# Patient Record
Sex: MALE | Race: WHITE
[De-identification: names, ages, dates, MRNs, and addresses within clinical notes are randomized per-mention and may not be internally consistent; named-entity substitution may affect disease eponyms.]

---

## 2017-02-23 ENCOUNTER — HOSPITAL ENCOUNTER (OUTPATIENT)
Dept: HOSPITAL 47 - RADMRIMAIN | Age: 32
Discharge: HOME | End: 2017-02-23
Payer: COMMERCIAL

## 2017-02-23 DIAGNOSIS — C71.9: Primary | ICD-10-CM

## 2017-02-23 DIAGNOSIS — I67.1: ICD-10-CM

## 2017-02-23 DIAGNOSIS — G35: ICD-10-CM

## 2017-02-23 PROCEDURE — 70544 MR ANGIOGRAPHY HEAD W/O DYE: CPT

## 2017-02-23 PROCEDURE — 70553 MRI BRAIN STEM W/O & W/DYE: CPT

## 2017-02-23 NOTE — MR
PRE AND POSTCONTRAST ENHANCED MRI OF THE BRAIN:

 

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
 ct of brain in pacs 

 

COMPARISON: August 4, 2014

 

 

CONTRAST: 20  ML Multihance

 

Multiplanar and multispin-echo imaging of the brain was performed both before and after the administr
ation of contrast. 

 

The ventricles, basal cisterns and sulci overlying the cerebral convexities are within normal limits.
  

 

There is no evidence for midline shift or mass effect.  

 

Acute intracranial hemorrhage or extra-axial collection is not evident.  

 

There are no abnormal areas of increased or decreased signal intensity within the brain parenchyma.  


 

Following contrast administration, there is no evidence for pathologic enhancement or enhancing mass.
  

 

The paranasal sinuses and mastoid air cells are well-aerated.

 

IMPRESSION:    

 

Unremarkable pre and postcontrast enhanced MRI of the brain. See separate MRA report.

## 2017-02-23 NOTE — MR
EXAMINATION TYPE: MR angio head wo con

 

DATE OF EXAM: 2/23/2017 10:53 AM

 

COMPARISON: August 4, 2014

 

HISTORY: aneurysm

 

Three-dimensional time-of-flight intracranial MRA was performed with multiple intensity projection im
ages submitted and source data reviewed at the workstation.  

 

The vertebrobasilar system as well as intracranial portions of the internal carotid arteries and thei
r major tributaries are patent. I cannot exclude a small 1.5 mm aneurysm at the take off of the left 
M2 segment.

 

IMPRESSION:

 

  I cannot exclude a small 1.5 mm aneurysm at the take off of the left M2 segment.

## 2017-03-22 ENCOUNTER — HOSPITAL ENCOUNTER (OUTPATIENT)
Dept: HOSPITAL 47 - RADECHMAIN | Age: 32
End: 2017-03-22
Payer: COMMERCIAL

## 2017-03-22 DIAGNOSIS — R53.81: ICD-10-CM

## 2017-03-22 DIAGNOSIS — I08.1: Primary | ICD-10-CM

## 2017-03-22 PROCEDURE — 93306 TTE W/DOPPLER COMPLETE: CPT

## 2017-03-22 NOTE — ECHOF
Referral Reason:R43.81 fatigue



MEASUREMENTS

--------

HEIGHT: 172.7 cm

WEIGHT: 86.2 kg

BP: 131/79

RVIDd:   2.9 cm     (< 3.3)

IVSd:   1.0 cm     (0.6 - 1.1)

LVIDd:   4.0 cm     (3.9 - 5.3)

LVPWd:   1.0 cm     (0.6 - 1.1)

IVSs:   1.5 cm

LVIDs:   2.9 cm

LVPWs:   1.5 cm

LA Diam:   3.0 cm     (2.7 - 3.8)

LAESV Index (A-L):   13.48 ml/m

Ao Diam:   2.9 cm     (2.0 - 3.7)

AV Cusp:   2.1 cm     (1.5 - 2.6)

LA Diam:   3.0 cm     (2.7 - 3.8)

MV EXCURSION:   19.783 mm     (> 18.000)

MV EF SLOPE:   160 mm/s     (70 - 150)

EPSS:   0.3 cm

MV E Don:   0.87 m/s

MV DecT:   228 ms

MV A Don:   0.71 m/s

MV E/A Ratio:   1.22 







FINDINGS

--------

Sinus rhythm.

This was a technically good study.

The left ventricular size is normal.   Left ventricular 

wall thickness is normal.   Overall left ventricular 

systolic function is normal with, an EF between 55 - 60 

%.

The right ventricle is normal in size.

Normal LA  size by volume 22+/-6 ml/m2.

The right atrium is normal in size.

The aortic valve is trileaflet and appears structurally 

normal.

The mitral valve is normal.   There is trace mitral 

regurgitation.

Trace tricuspid regurgitation present.   Right 

ventricular systolic pressure is normal at < 35 mmHg.

There is no pulmonic regurgitation present.

The aortic root size is normal.

IVC Not well visulized.

There is no pericardial effusion.



CONCLUSIONS

--------

1. Sinus rhythm.

2. There is no pulmonic regurgitation present.

3. The aortic root size is normal.

4. IVC Not well visulized.

5. There is no pericardial effusion.

6. This was a technically good study.

7. Left ventricular wall thickness is normal.

8. Overall left ventricular systolic function is normal 

with, an EF between 55 - 60 %.

9. Normal LA size by volume 22+/-6 ml/m2.

10. The aortic valve is trileaflet and appears structurally 

normal.

11. There is trace mitral regurgitation.

12. Trace tricuspid regurgitation present.

13. Right ventricular systolic pressure is normal at < 35 

mmHg.





SONOGRAPHER: Toni Noonan RDCS

## 2017-05-26 ENCOUNTER — HOSPITAL ENCOUNTER (OUTPATIENT)
Dept: HOSPITAL 47 - LABWHC1 | Age: 32
Discharge: HOME | End: 2017-05-26
Payer: COMMERCIAL

## 2017-05-26 DIAGNOSIS — R26.81: ICD-10-CM

## 2017-05-26 DIAGNOSIS — R42: Primary | ICD-10-CM

## 2017-05-26 LAB
ANA W/REFLEX TO TITER: NEGATIVE
BUN SERPL-SCNC: 13 MG/DL (ref 9–20)
NON-AFRICAN AMERICAN GFR(MDRD): >60
RHEUMATOID FACT SERPL-ACNC: <9 IU/ML (ref ?–12)
VIT B12 SERPL-MCNC: 669 PG/ML (ref 239–931)

## 2017-05-26 PROCEDURE — 86225 DNA ANTIBODY NATIVE: CPT

## 2017-05-26 PROCEDURE — 86038 ANTINUCLEAR ANTIBODIES: CPT

## 2017-05-26 PROCEDURE — 86431 RHEUMATOID FACTOR QUANT: CPT

## 2017-05-26 PROCEDURE — 83655 ASSAY OF LEAD: CPT

## 2017-05-26 PROCEDURE — 82607 VITAMIN B-12: CPT

## 2017-05-26 PROCEDURE — 82565 ASSAY OF CREATININE: CPT

## 2017-05-26 PROCEDURE — 84520 ASSAY OF UREA NITROGEN: CPT

## 2017-05-26 PROCEDURE — 82746 ASSAY OF FOLIC ACID SERUM: CPT

## 2017-05-26 PROCEDURE — 36415 COLL VENOUS BLD VENIPUNCTURE: CPT

## 2017-05-26 PROCEDURE — 82175 ASSAY OF ARSENIC: CPT

## 2017-06-05 ENCOUNTER — HOSPITAL ENCOUNTER (OUTPATIENT)
Dept: HOSPITAL 47 - RADMRIMAIN | Age: 32
Discharge: HOME | End: 2017-06-05
Payer: COMMERCIAL

## 2017-06-05 DIAGNOSIS — M50.222: Primary | ICD-10-CM

## 2017-06-05 DIAGNOSIS — G35: ICD-10-CM

## 2017-06-05 PROCEDURE — 70553 MRI BRAIN STEM W/O & W/DYE: CPT

## 2017-06-05 PROCEDURE — 72156 MRI NECK SPINE W/O & W/DYE: CPT

## 2017-06-05 NOTE — MR
MR brain and cervical spine with and without contrast

 

HISTORY: Multiple sclerosis

 

Multiplanar multisequence and postcontrast images obtained through the brain and cervical spine follo
wing 20 cc MultiHance IV

 

Relation to prior MRI brain 2/23/2017, CT cervical spine 7/27/2015

 

Brain MRI with and without contrast: Single focal hyperintensity in inversion recovery and T2 sequenc
es within the right frontal deep white matter is stable. There is no hemorrhage or hydrocephalus. 2 e
nteric, cervical medullary junction, cerebellopontine angles are normal. There are normal vascular fl
ow voids. There is no restricted diffusion. The orbits show symmetric appearance. No abnormal enhance
ment following contrast administration.

 

IMPRESSION: Stable exam, no significant interval change is evident.

 

Cervical spine MRI: Cervical vertebral bodies show preserved height and alignment, bone marrow signal
. Cervical cord signal is maintained.

 

C2-3: Unremarkable

 

C3-4: Bilateral foraminal encroachment is present right greater than left due to lateral extension of
 endplate disc complex, no significant spinal stenosis or disc herniation

 

C4-5: There is some left-sided foraminal encroachment. No evident disc herniation. No significant pamela
tral stenosis.

 

C5-6: There is a posterior disc herniation which contacts the anterior cervical cord. On mild central
 stenosis. There is bilateral foraminal encroachment.

 

C6-7: Small posterior central disc protrusion causes minimal anterior mass effect on the thecal sac. 
Suspect some foraminal encroachment on the left greater than right.

 

C7-T1: Within normal limits.

 

Disc spaces relatively maintained. No abnormal enhancement following contrast menstruation.

 

IMPRESSION: Small disc herniation C5-6, multilevel foraminal encroachment.

## 2018-05-25 ENCOUNTER — HOSPITAL ENCOUNTER (OUTPATIENT)
Dept: HOSPITAL 47 - RADMRIMAIN | Age: 33
Discharge: HOME | End: 2018-05-25
Attending: PSYCHIATRY & NEUROLOGY
Payer: COMMERCIAL

## 2018-05-25 DIAGNOSIS — H47.212: Primary | ICD-10-CM

## 2018-05-25 DIAGNOSIS — Z88.0: ICD-10-CM

## 2018-05-25 PROCEDURE — 70543 MRI ORBT/FAC/NCK W/O &W/DYE: CPT

## 2018-05-25 NOTE — MR
EXAMINATION TYPE: MR orbits wo/w con

 

DATE OF EXAM: 5/25/2018

 

COMPARISON: NONE

 

HISTORY: Primary optic atrophy, left eye

 

CONTRAST:  Performed utilizing 7.5 mL intravenous Gadavist gadolinium contrast.  

 

TECHNIQUE: Multiplanar, multiecho imaging on a 3.0 Breanna magnet is performed through the brain.  Stud
y is performed within 24 hours of arrival to the hospital. Dedicated images were obtained through the
 orbits.

 

The craniovertebral junction is normal.  The pituitary is normal.  The pituitary stalk is midline. No
te is made a right septal deviation. The optic chiasm appears unremarkable.

 

No intraconal or extraconal fat signal abnormality is evident. Signal within the extraocular muscles 
appear unremarkable. No expansion of the extraocular muscles are evident

 

The globes are symmetrical. Lenses appear unremarkable. Optic nerves appear normal. The left optic ne
rve appears symmetrical with the contralateral right No signal abnormality within the optic nerves is
 evident.

 

Ventricles and sulci as visualized on this exam are appropriate for the patient age.

 

 

IMPRESSIONS:

1. Normal-appearing optic nerves.

## 2018-06-26 ENCOUNTER — HOSPITAL ENCOUNTER (OUTPATIENT)
Dept: HOSPITAL 47 - LABWHC1 | Age: 33
Discharge: HOME | End: 2018-06-26
Payer: COMMERCIAL

## 2018-06-26 DIAGNOSIS — R53.83: Primary | ICD-10-CM

## 2018-06-26 LAB
ACTH PLAS-MCNC: 10.3 PG/ML (ref 0–45.99)
ALBUMIN SERPL-MCNC: 4.6 G/DL (ref 3.5–5)
ALP SERPL-CCNC: 64 U/L (ref 38–126)
ALT SERPL-CCNC: 29 U/L (ref 21–72)
ANION GAP SERPL CALC-SCNC: 11 MMOL/L
AST SERPL-CCNC: 24 U/L (ref 17–59)
BUN SERPL-SCNC: 20 MG/DL (ref 9–20)
CALCIUM SPEC-MCNC: 9.7 MG/DL (ref 8.4–10.2)
CHLORIDE SERPL-SCNC: 103 MMOL/L (ref 98–107)
CO2 SERPL-SCNC: 26 MMOL/L (ref 22–30)
ERYTHROCYTE [DISTWIDTH] IN BLOOD BY AUTOMATED COUNT: 5.46 M/UL (ref 4.3–5.9)
ERYTHROCYTE [DISTWIDTH] IN BLOOD: 12.9 % (ref 11.5–15.5)
GLUCOSE SERPL-MCNC: 90 MG/DL (ref 74–99)
HCT VFR BLD AUTO: 46.4 % (ref 39–53)
HGB BLD-MCNC: 15.1 GM/DL (ref 13–17.5)
MCH RBC QN AUTO: 27.7 PG (ref 25–35)
MCHC RBC AUTO-ENTMCNC: 32.6 G/DL (ref 31–37)
MCV RBC AUTO: 84.8 FL (ref 80–100)
PLATELET # BLD AUTO: 323 K/UL (ref 150–450)
POTASSIUM SERPL-SCNC: 4.3 MMOL/L (ref 3.5–5.1)
PROT SERPL-MCNC: 7.3 G/DL (ref 6.3–8.2)
SODIUM SERPL-SCNC: 140 MMOL/L (ref 137–145)
T4 FREE SERPL-MCNC: 1.24 NG/DL (ref 0.78–2.19)
VIT B12 SERPL-MCNC: 658 PG/ML (ref 200–944)
WBC # BLD AUTO: 7.4 K/UL (ref 3.8–10.6)

## 2018-06-26 PROCEDURE — 80053 COMPREHEN METABOLIC PANEL: CPT

## 2018-06-26 PROCEDURE — 82533 TOTAL CORTISOL: CPT

## 2018-06-26 PROCEDURE — 36415 COLL VENOUS BLD VENIPUNCTURE: CPT

## 2018-06-26 PROCEDURE — 83002 ASSAY OF GONADOTROPIN (LH): CPT

## 2018-06-26 PROCEDURE — 84439 ASSAY OF FREE THYROXINE: CPT

## 2018-06-26 PROCEDURE — 82024 ASSAY OF ACTH: CPT

## 2018-06-26 PROCEDURE — 84443 ASSAY THYROID STIM HORMONE: CPT

## 2018-06-26 PROCEDURE — 86376 MICROSOMAL ANTIBODY EACH: CPT

## 2018-06-26 PROCEDURE — 82607 VITAMIN B-12: CPT

## 2018-06-26 PROCEDURE — 84146 ASSAY OF PROLACTIN: CPT

## 2018-06-26 PROCEDURE — 84403 ASSAY OF TOTAL TESTOSTERONE: CPT

## 2018-06-26 PROCEDURE — 85027 COMPLETE CBC AUTOMATED: CPT

## 2018-06-26 PROCEDURE — 84481 FREE ASSAY (FT-3): CPT

## 2018-07-11 ENCOUNTER — HOSPITAL ENCOUNTER (OUTPATIENT)
Dept: HOSPITAL 47 - RADCTMAIN | Age: 33
Discharge: HOME | End: 2018-07-11
Attending: OTOLARYNGOLOGY
Payer: COMMERCIAL

## 2018-07-11 DIAGNOSIS — J32.9: Primary | ICD-10-CM

## 2018-07-11 PROCEDURE — 70486 CT MAXILLOFACIAL W/O DYE: CPT

## 2018-07-11 NOTE — CT
EXAMINATION TYPE: CT sinus wo con

 

DATE OF EXAM: 7/11/2018

 

COMPARISON: CT brain July 27, 2015

 

HISTORY: Chronic sinusitis for the past 5 months. Vertigo and facial pressure for 5 months with heada
ches and vision changes as well as dizziness and facial numbness all per patient.

 

CT DLP: 628.70 mGycm.  Automated Exposure Control for Dose Reduction was Utilized.

 

TECHNIQUE: CT scan of the sinuses is performed without contrast, axial images are obtained, coronal r
eformatted images are also reviewed.

 

FINDINGS: The  paranasal sinuses including the frontal, ethmoid, sphenoid, and maxillary sinuses bila
terally are well-aerated without abnormal opacification.  The ostiomeatal complex is patent bilateral
ly on the coronal images. Nasal septum is deviated to right of midline not significantly changed from
 prior.

 

Visualized portion of mastoid air cells show no abnormal opacification.  The globes are intact bilate
rally.  Incidental dental cavity or caries involving the right third lateral maxillary tooth seen axi
al image 5 and coronal image 27 approaching but sparing central root.

 

IMPRESSION: The sinuses are clear and the ostiomeatal complex is patent bilaterally. Incidental right
 lateral maxillary third tooth cavity.

## 2018-07-12 ENCOUNTER — HOSPITAL ENCOUNTER (EMERGENCY)
Dept: HOSPITAL 47 - EC | Age: 33
Discharge: HOME | End: 2018-07-12
Payer: COMMERCIAL

## 2018-07-12 VITALS — RESPIRATION RATE: 18 BRPM

## 2018-07-12 VITALS — HEART RATE: 87 BPM | DIASTOLIC BLOOD PRESSURE: 87 MMHG | SYSTOLIC BLOOD PRESSURE: 135 MMHG | TEMPERATURE: 97.4 F

## 2018-07-12 DIAGNOSIS — Z87.891: ICD-10-CM

## 2018-07-12 DIAGNOSIS — M79.602: ICD-10-CM

## 2018-07-12 DIAGNOSIS — R07.9: Primary | ICD-10-CM

## 2018-07-12 DIAGNOSIS — Z88.0: ICD-10-CM

## 2018-07-12 LAB
ALBUMIN SERPL-MCNC: 4.7 G/DL (ref 3.5–5)
ALP SERPL-CCNC: 64 U/L (ref 38–126)
ALT SERPL-CCNC: 34 U/L (ref 21–72)
ANION GAP SERPL CALC-SCNC: 10 MMOL/L
APTT BLD: 23.5 SEC (ref 22–30)
AST SERPL-CCNC: 23 U/L (ref 17–59)
BASOPHILS # BLD AUTO: 0 K/UL (ref 0–0.2)
BASOPHILS NFR BLD AUTO: 0 %
BUN SERPL-SCNC: 20 MG/DL (ref 9–20)
CALCIUM SPEC-MCNC: 9.8 MG/DL (ref 8.4–10.2)
CHLORIDE SERPL-SCNC: 105 MMOL/L (ref 98–107)
CK SERPL-CCNC: 118 U/L (ref 55–170)
CO2 SERPL-SCNC: 24 MMOL/L (ref 22–30)
D DIMER PPP FEU-MCNC: <0.17 MG/L FEU (ref ?–0.6)
EOSINOPHIL # BLD AUTO: 0.1 K/UL (ref 0–0.7)
EOSINOPHIL NFR BLD AUTO: 1 %
ERYTHROCYTE [DISTWIDTH] IN BLOOD BY AUTOMATED COUNT: 5.13 M/UL (ref 4.3–5.9)
ERYTHROCYTE [DISTWIDTH] IN BLOOD: 13 % (ref 11.5–15.5)
GLUCOSE SERPL-MCNC: 89 MG/DL (ref 74–99)
HCT VFR BLD AUTO: 42.8 % (ref 39–53)
HGB BLD-MCNC: 14.4 GM/DL (ref 13–17.5)
INR PPP: 1.1 (ref ?–1.2)
LYMPHOCYTES # SPEC AUTO: 2.4 K/UL (ref 1–4.8)
LYMPHOCYTES NFR SPEC AUTO: 27 %
MAGNESIUM SPEC-SCNC: 2.2 MG/DL (ref 1.6–2.3)
MCH RBC QN AUTO: 28 PG (ref 25–35)
MCHC RBC AUTO-ENTMCNC: 33.6 G/DL (ref 31–37)
MCV RBC AUTO: 83.5 FL (ref 80–100)
MONOCYTES # BLD AUTO: 0.5 K/UL (ref 0–1)
MONOCYTES NFR BLD AUTO: 5 %
NEUTROPHILS # BLD AUTO: 5.9 K/UL (ref 1.3–7.7)
NEUTROPHILS NFR BLD AUTO: 65 %
PLATELET # BLD AUTO: 337 K/UL (ref 150–450)
POTASSIUM SERPL-SCNC: 3.9 MMOL/L (ref 3.5–5.1)
PROT SERPL-MCNC: 7.5 G/DL (ref 6.3–8.2)
PT BLD: 10.6 SEC (ref 9–12)
SODIUM SERPL-SCNC: 139 MMOL/L (ref 137–145)
TROPONIN I SERPL-MCNC: <0.012 NG/ML (ref 0–0.03)
WBC # BLD AUTO: 9.2 K/UL (ref 3.8–10.6)

## 2018-07-12 PROCEDURE — 71046 X-RAY EXAM CHEST 2 VIEWS: CPT

## 2018-07-12 PROCEDURE — 80053 COMPREHEN METABOLIC PANEL: CPT

## 2018-07-12 PROCEDURE — 99285 EMERGENCY DEPT VISIT HI MDM: CPT

## 2018-07-12 PROCEDURE — 85610 PROTHROMBIN TIME: CPT

## 2018-07-12 PROCEDURE — 83735 ASSAY OF MAGNESIUM: CPT

## 2018-07-12 PROCEDURE — 36415 COLL VENOUS BLD VENIPUNCTURE: CPT

## 2018-07-12 PROCEDURE — 84484 ASSAY OF TROPONIN QUANT: CPT

## 2018-07-12 PROCEDURE — 93005 ELECTROCARDIOGRAM TRACING: CPT

## 2018-07-12 PROCEDURE — 85730 THROMBOPLASTIN TIME PARTIAL: CPT

## 2018-07-12 PROCEDURE — 82553 CREATINE MB FRACTION: CPT

## 2018-07-12 PROCEDURE — 85025 COMPLETE CBC W/AUTO DIFF WBC: CPT

## 2018-07-12 PROCEDURE — 82550 ASSAY OF CK (CPK): CPT

## 2018-07-12 PROCEDURE — 85379 FIBRIN DEGRADATION QUANT: CPT

## 2018-07-12 NOTE — XR
EXAMINATION TYPE: XR chest 2V

 

DATE OF EXAM: 7/12/2018

 

COMPARISON: 7/20/1715

 

HISTORY: Left-sided chest pain

 

TECHNIQUE:  Frontal and lateral views of the chest are obtained.

 

FINDINGS:  Heart and mediastinum are normal. Lungs are clear. Diaphragm is normal. Bony thorax appear
s normal. There are chest leads.

 

IMPRESSION:  Normal chest. No change.

## 2018-07-12 NOTE — ED
General Adult HPI





- General


Chief complaint: Chest Pain


Stated complaint: chest pain


Time Seen by Provider: 07/12/18 21:38


Source: patient, RN notes reviewed, old records reviewed


Mode of arrival: ambulatory


Limitations: no limitations





- History of Present Illness


Initial comments: 





33-year-old male presents for evaluation of left-sided chest pain.  Symptoms 

have been ongoing for the past several hours.  Pain is intermittent and sharp 

in nature.  Patient denies dyspnea.  Denies vomiting or diarrhea.  He has had 

some intermittent abdominal bloating.  Patient has no known heart history.  No 

history DVT or PE.  Patient is chest pain-free at the time my evaluation.  

Denies worsening pain with dyspnea.  No fever or chills.  No cough.  Patient's 

chest pain is associated with left arm pain which is also intermittent.





- Related Data


 Home Medications











 Medication  Instructions  Recorded  Confirmed


 


No Known Home Medications  06/20/14 06/20/14











 Allergies











Allergy/AdvReac Type Severity Reaction Status Date / Time


 


Penicillins Allergy  Unknown Verified 07/12/18 21:24





   Childhood  














Review of Systems


ROS Statement: 


Those systems with pertinent positive or pertinent negative responses have been 

documented in the HPI.





ROS Other: All systems not noted in ROS Statement are negative.





Past Medical History


Past Medical History: No Reported History


History of Any Multi-Drug Resistant Organisms: None Reported


Past Surgical History: No Surgical Hx Reported


Past Psychological History: No Psychological Hx Reported, Anxiety, Depression


Smoking Status: Former smoker


Past Alcohol Use History: None Reported


Past Drug Use History: None Reported





General Exam


Limitations: no limitations


General appearance: alert, in no apparent distress


Head exam: Present: atraumatic, normocephalic


Eye exam: Present: normal appearance, PERRL


ENT exam: Present: normal exam


Neck exam: Present: normal inspection.  Absent: tenderness, meningismus


Respiratory exam: Present: normal lung sounds bilaterally.  Absent: respiratory 

distress, wheezes


Cardiovascular Exam: Present: regular rate, normal rhythm


GI/Abdominal exam: Present: soft.  Absent: distended, tenderness


Extremities exam: Present: normal inspection, normal capillary refill, other (

Bilateral radial pulses 2+).  Absent: pedal edema


Back exam: Present: normal inspection


Neurological exam: Present: alert, oriented X3, CN II-XII intact.  Absent: 

motor sensory deficit


Psychiatric exam: Present: normal affect, normal mood


Skin exam: Present: warm, dry, intact





Course


 Vital Signs











  07/12/18





  21:22


 


Temperature 98.3 F


 


Pulse Rate 98


 


Respiratory 18





Rate 


 


Blood Pressure 115/75


 


O2 Sat by Pulse 98





Oximetry 














EKG Findings





- EKG Comments:


EKG Findings:: EKG: Normal sinus rhythm, rate 82, AK interval 168, QRS duration 

98,  no ST segment changes, upright T waves.





Medical Decision Making





- Medical Decision Making





33-year-old male presents for intermittent left-sided chest pain and left arm 

pain.  Patient is pain-free at the time my evaluation.  Exam is unremarkable.  

Chest x-ray obtained, negative for pneumothorax or acute cardiopulmonary 

disease.  EKG is nonischemic.  Laboratory studies reveal normal CBC, negative d-

dimer, negative troponin.  CMP is unremarkable.  Patient will follow-up with 

primary care physician.  Return with worsening or changing symptoms.





- Lab Data


Result diagrams: 


 07/12/18 21:53





 07/12/18 21:53


 Lab Results











  07/12/18 07/12/18 07/12/18 Range/Units





  21:53 21:53 21:53 


 


WBC   9.2   (3.8-10.6)  k/uL


 


RBC   5.13   (4.30-5.90)  m/uL


 


Hgb   14.4   (13.0-17.5)  gm/dL


 


Hct   42.8   (39.0-53.0)  %


 


MCV   83.5   (80.0-100.0)  fL


 


MCH   28.0   (25.0-35.0)  pg


 


MCHC   33.6   (31.0-37.0)  g/dL


 


RDW   13.0   (11.5-15.5)  %


 


Plt Count   337   (150-450)  k/uL


 


Neutrophils %   65   %


 


Lymphocytes %   27   %


 


Monocytes %   5   %


 


Eosinophils %   1   %


 


Basophils %   0   %


 


Neutrophils #   5.9   (1.3-7.7)  k/uL


 


Lymphocytes #   2.4   (1.0-4.8)  k/uL


 


Monocytes #   0.5   (0-1.0)  k/uL


 


Eosinophils #   0.1   (0-0.7)  k/uL


 


Basophils #   0.0   (0-0.2)  k/uL


 


PT     (9.0-12.0)  sec


 


INR     (<1.2)  


 


APTT     (22.0-30.0)  sec


 


D-Dimer     (<0.60)  mg/L FEU


 


Sodium    139  (137-145)  mmol/L


 


Potassium    3.9  (3.5-5.1)  mmol/L


 


Chloride    105  ()  mmol/L


 


Carbon Dioxide    24  (22-30)  mmol/L


 


Anion Gap    10  mmol/L


 


BUN    20  (9-20)  mg/dL


 


Creatinine    0.90  (0.66-1.25)  mg/dL


 


Est GFR (CKD-EPI)AfAm    >90  (>60 ml/min/1.73 sqM)  


 


Est GFR (CKD-EPI)NonAf    >90  (>60 ml/min/1.73 sqM)  


 


Glucose    89  (74-99)  mg/dL


 


Calcium    9.8  (8.4-10.2)  mg/dL


 


Magnesium    2.2  (1.6-2.3)  mg/dL


 


Total Bilirubin    0.4  (0.2-1.3)  mg/dL


 


AST    23  (17-59)  U/L


 


ALT    34  (21-72)  U/L


 


Alkaline Phosphatase    64  ()  U/L


 


Total Creatine Kinase  118    ()  U/L


 


CK-MB (CK-2)  0.5    (0.0-2.4)  ng/mL


 


CK-MB (CK-2) Rel Index  0.4    


 


Troponin I  <0.012    (0.000-0.034)  ng/mL


 


Total Protein    7.5  (6.3-8.2)  g/dL


 


Albumin    4.7  (3.5-5.0)  g/dL














  07/12/18 Range/Units





  21:53 


 


WBC   (3.8-10.6)  k/uL


 


RBC   (4.30-5.90)  m/uL


 


Hgb   (13.0-17.5)  gm/dL


 


Hct   (39.0-53.0)  %


 


MCV   (80.0-100.0)  fL


 


MCH   (25.0-35.0)  pg


 


MCHC   (31.0-37.0)  g/dL


 


RDW   (11.5-15.5)  %


 


Plt Count   (150-450)  k/uL


 


Neutrophils %   %


 


Lymphocytes %   %


 


Monocytes %   %


 


Eosinophils %   %


 


Basophils %   %


 


Neutrophils #   (1.3-7.7)  k/uL


 


Lymphocytes #   (1.0-4.8)  k/uL


 


Monocytes #   (0-1.0)  k/uL


 


Eosinophils #   (0-0.7)  k/uL


 


Basophils #   (0-0.2)  k/uL


 


PT  10.6  (9.0-12.0)  sec


 


INR  1.1  (<1.2)  


 


APTT  23.5  (22.0-30.0)  sec


 


D-Dimer  <0.17  (<0.60)  mg/L FEU


 


Sodium   (137-145)  mmol/L


 


Potassium   (3.5-5.1)  mmol/L


 


Chloride   ()  mmol/L


 


Carbon Dioxide   (22-30)  mmol/L


 


Anion Gap   mmol/L


 


BUN   (9-20)  mg/dL


 


Creatinine   (0.66-1.25)  mg/dL


 


Est GFR (CKD-EPI)AfAm   (>60 ml/min/1.73 sqM)  


 


Est GFR (CKD-EPI)NonAf   (>60 ml/min/1.73 sqM)  


 


Glucose   (74-99)  mg/dL


 


Calcium   (8.4-10.2)  mg/dL


 


Magnesium   (1.6-2.3)  mg/dL


 


Total Bilirubin   (0.2-1.3)  mg/dL


 


AST   (17-59)  U/L


 


ALT   (21-72)  U/L


 


Alkaline Phosphatase   ()  U/L


 


Total Creatine Kinase   ()  U/L


 


CK-MB (CK-2)   (0.0-2.4)  ng/mL


 


CK-MB (CK-2) Rel Index   


 


Troponin I   (0.000-0.034)  ng/mL


 


Total Protein   (6.3-8.2)  g/dL


 


Albumin   (3.5-5.0)  g/dL














Disposition


Clinical Impression: 


 Chest pain





Disposition: HOME SELF-CARE


Condition: Good


Instructions:  Chest Pain (ED)


Is patient prescribed a controlled substance at d/c from ED?: No


Referrals: 


Abraham Dickey MD [Primary Care Provider] - 1-2 days


Time of Disposition: 22:41

## 2018-10-04 ENCOUNTER — HOSPITAL ENCOUNTER (OUTPATIENT)
Dept: HOSPITAL 47 - ORWHC2ENDO | Age: 33
Discharge: HOME | End: 2018-10-04
Payer: COMMERCIAL

## 2018-10-04 VITALS — SYSTOLIC BLOOD PRESSURE: 109 MMHG | DIASTOLIC BLOOD PRESSURE: 62 MMHG | RESPIRATION RATE: 16 BRPM | HEART RATE: 76 BPM

## 2018-10-04 VITALS — TEMPERATURE: 98.2 F

## 2018-10-04 DIAGNOSIS — K20.0: ICD-10-CM

## 2018-10-04 DIAGNOSIS — Z79.899: ICD-10-CM

## 2018-10-04 DIAGNOSIS — Z88.0: ICD-10-CM

## 2018-10-04 DIAGNOSIS — K29.50: Primary | ICD-10-CM

## 2018-10-04 PROCEDURE — 43239 EGD BIOPSY SINGLE/MULTIPLE: CPT

## 2018-10-04 PROCEDURE — 88305 TISSUE EXAM BY PATHOLOGIST: CPT

## 2022-01-31 NOTE — P.PCN
Date of Procedure: 10/04/18


Description of Procedure: 


BRIEF HISTORY: Patient is a 33-year-old, pleasant, male patient who presented 

with multiple upper GI complaints to the outpatient gastroenterology clinic.  

The patient reported frequent episodes of abdominal distention and bloating 

with excessive belching.  He also noted intermittent episodes of nausea as well 

as a globus sensation and occasional dysphagia.  He is seen for EGD today for 

further evaluation.  He also notes today that his father was recently diagnosed 

with esophageal adenocarcinoma





PROCEDURE PERFORMED: Esophagogastroduodenoscopy with cold biopsy.





PREOPERATIVE DIAGNOSIS: Globus sensation, nausea, dysphagia, abdominal 

distention and bloating. 





ESTIMATED BLOOD LOSS: Minimal.





IV sedation per anesthesia. 





PROCEDURE: After informed consent was obtained, the patient  was brought into 

the endoscopy unit. IV sedation was administered by Anesthesia under continuous 

monitoring. Initially the Olympus GIF-190 video endoscope was inserted into the 

mouth. Esophagus intubated without any difficulty. It was gradually advanced 

into the stomach and duodenum and carefully examined. The bulb and the second 

part of the duodenum were grossly normal however some villous blunting was 

noted and biopsied.  The scope at this time was withdrawn to the stomach, 

adequately insufflated with air, and upon careful examination, mucosa of the 

antrum, body, cardia and the fundus appeared grossly normal.  Mild scattered 

inflammation suggestive of gastritis with some superficial erosions were noted 

in the antrum and body and were biopsied The scope was then withdrawn into the 

esophagus. The GE junction was located at 38 cm from the incisors with biopsies 

taken of an irregular Z line. The esophagus appeared normal with mid esophageal 

biopsies to rule out eosinophilic esophagitis. There were no erosions or 

ulcerations seen and the patient tolerated the procedure well. 





IMPRESSION: 


1.  Irregular Z line, gastritis, biopsies taken of both.  Biopsies also taken 

of the midesophagus and duodenum.


2.  No other gross abnormalities seen to explain patient's symptoms.





RECOMMENDATIONS: The findings of this examination were discussed with the 

patient and his brother.  Will await pathology from biopsies and determination 

of further therapy depending on findings.  Follow up with gastroenterology in 

the outpatient setting as previously scheduled. [place]